# Patient Record
Sex: FEMALE | Race: WHITE | Employment: UNEMPLOYED | URBAN - METROPOLITAN AREA
[De-identification: names, ages, dates, MRNs, and addresses within clinical notes are randomized per-mention and may not be internally consistent; named-entity substitution may affect disease eponyms.]

---

## 2022-03-23 ENCOUNTER — HOME HEALTH ADMISSION (OUTPATIENT)
Dept: HOME HEALTH SERVICES | Facility: HOME HEALTH | Age: 62
End: 2022-03-23
Payer: MEDICARE

## 2022-03-24 ENCOUNTER — HOME CARE VISIT (OUTPATIENT)
Dept: SCHEDULING | Facility: HOME HEALTH | Age: 62
End: 2022-03-24
Payer: MEDICARE

## 2022-03-24 PROCEDURE — 400013 HH SOC

## 2022-03-24 PROCEDURE — G0299 HHS/HOSPICE OF RN EA 15 MIN: HCPCS

## 2022-03-24 NOTE — Clinical Note
I admitted this patient yesterday and she has a new ostomy site because she had a hernia and had surgery to take down her original site and move it to her left side. She has a wound that is 3cm deep from the take down site and I called for wound care orders but they are just saying to cover it with a bandage? It was packed with a wet to dry upon assessment and I had not heard back from the doc until later in the afternoon so that is what I did. Does that sound right? Also I am having a very hard time figuring out what to order her. Her daughter is a  nurse and was trying to tell me what to order but I am having a hard time. Can you please move one of your patients to tomorrow or I can switch you a patient for her. I know you can help her way more than I can. Thank you! Text me or call me if you want more info.     Thanks Davonte Alvarado  [phone]

## 2022-03-25 ENCOUNTER — HOME CARE VISIT (OUTPATIENT)
Dept: SCHEDULING | Facility: HOME HEALTH | Age: 62
End: 2022-03-25
Payer: MEDICARE

## 2022-03-25 ENCOUNTER — HOME CARE VISIT (OUTPATIENT)
Dept: HOME HEALTH SERVICES | Facility: HOME HEALTH | Age: 62
End: 2022-03-25
Payer: MEDICARE

## 2022-03-25 PROCEDURE — A4452 WATERPROOF TAPE: HCPCS

## 2022-03-25 PROCEDURE — MED10158 APPLICATOR, COTTON-TIP, WOOD, 6, STRL

## 2022-03-25 PROCEDURE — A6253 ABSORPT DRG > 48 SQ IN W/O B: HCPCS

## 2022-03-25 PROCEDURE — MED12556 CAN,SPRAY,7.1-OZ,SALINE,WOUND WASH,STRL

## 2022-03-25 PROCEDURE — A6442 CONFORM BAND N/S W<3"/YD: HCPCS

## 2022-03-25 PROCEDURE — A6252 ABSORPT DRG >16 <=48 W/O BDR: HCPCS

## 2022-03-25 PROCEDURE — A4216 STERILE WATER/SALINE, 10 ML: HCPCS

## 2022-03-25 PROCEDURE — A5120 SKIN BARRIER, WIPE OR SWAB: HCPCS

## 2022-03-25 PROCEDURE — G0299 HHS/HOSPICE OF RN EA 15 MIN: HCPCS

## 2022-03-25 PROCEDURE — A4394 OSTOMY POUCH LIQ DEODORANT: HCPCS

## 2022-03-25 PROCEDURE — A6402 STERILE GAUZE <= 16 SQ IN: HCPCS

## 2022-03-25 NOTE — HOME HEALTH
No wound care orders for take down site of previous colostomy. Called Dr Rubin's office to obtain, office called me back left message stating no wound care orders at this time and to just place a bandage on it. Daughter of patient stated that was unacceptable and would I call the office back to clarify. I called and left another message asking to clarify and to obtain wet to dry packing wound care orders. The patient's daughter has also called and asked for wound care orders. Patients daughter states she is a wound care nurse. Awaiting call back      services/Home bound verification: need for home care and homebound status are decreased activity tolerance, procedure teaching and recent hospitalization/surgery. Skilled Reason for admission/summary of clinical condition:  S/P Small bowel resection with extensive lysis of adhesion and creation of new ileostomy education and management    This patient is homebound for the following reasons Requires considerable and taxing effort to leave the home  and Requires the assistance of 1 or more persons to leave the home . Caregiver: patients cg is daughter and is available as needed or assistance with IADL's, ADL's, meal prep, medication management, and taking patient to all doctors appointments. Medications reconciled and all medications are available in the home this visit. The following education was provided regarding medications: All patient medications were reviewed, including side effects, safety, time to administer, purposes, dosages, and frequencies. Medications  are effective at this time.       High risk medication teaching regarding anticoagulants, hyperglycemic agents or opiod narcotics performed (specify) Percocet-risk for dependence, risk for respiratory depression    Dr Ewa Mitchell notified of any discrepancies/look a like medications/medication interactions none    Home health supplies by type and quantity ordered/delivered this visit include:     Patient education provided this visit to include: will order next visit-patient unsure of what she would like. She has used certain items with her previous colostomy and is not sure what she will like. Patient level of understanding of education provided: verbalized understanding    Sharps Education Provided: NA  Patient response to procedure performed:  patient was vert anxious but denied pain    Home exercise program/Homework provided: activity as tolerated, trying to get physical activity 4-5 x weekly. stopping activity if causing shortness of breath or chest pain, dizziness or weakness. Pt/Caregiver instructed on plan of care and are agreeable to plan of care at this time. Physician Dr Noemy Jackson notified via phone of patient admission to home health and plan of care including anticipated frequency of 1d3,4w1,3w2,2w2,1w2,2prn and treatments/interventions/modalities of S/P Small bowel resection with extensive lysis of adhesion and creation of new ileostomy       Discharge planning discussed with patient and caregiver. Discharge planning as follows:  Patient will be discharged once education is complete, and pt is medically stable. Pt/Caregiver did verbalize understanding of discharge planning. Next MD appointment April 14th with Dr Noemy Jackson MD/NP/PA. Patient/caregiver encouraged/instructed to keep appointment as lack of follow through with physician appointment could result in discontinuation of home care services for non-compliance.

## 2022-03-26 ENCOUNTER — HOME CARE VISIT (OUTPATIENT)
Dept: SCHEDULING | Facility: HOME HEALTH | Age: 62
End: 2022-03-26
Payer: MEDICARE

## 2022-03-26 PROCEDURE — G0299 HHS/HOSPICE OF RN EA 15 MIN: HCPCS

## 2022-03-26 NOTE — HOME HEALTH
Skilled reason for visit: assess pts ostomy and ordering supplies, vs, pain, teaching wound care, meds    Caregiver involvement: patients cg is daughter and she is available as needed or assistance with IADL's, ADL's, meal prep, medication management, and taking patient to all doctors appointments. Medications reviewed and all medications are available in the home this visit. The following education was provided regarding medications, medication interactions, and look alike medications (specify): percocet, ativan,   Medications are effective at this time. Medications reconciled and all meds in home    Home health supplies by type and quantity ordered/delivered this visit include: [employer] for new pt supplies box. Pt is interested in convex wafer due to stoma not protruding enough. Pt is using gabriella supplies right now and wants to try coloplast 2 piece convex. She verb that using the one piece is to hard to get off her skin. Patient education provided this visit: see interventions     Patient level of understanding of education provided: pt is understanding of all procedures performed. Skilled Care Performed this visit: pt stated that ostomy wafer/bag was just placed by RN yesterday. no leaks noted. Pt stated wafer and bag were good and didn't need to be changed. pt has had previous colostomy and is very fluent with ostomy care. Pt stated her colostomy was incarcerated with hernia and had to be removed due to no vascular blood flow. Pt daughter is home care nurse and also helps her mother with her wound care. Awaiting MD to send over wound care orders for old ostomy site on right side of abdomen. Pt very please with JIMI Zelaya with ostomy teaching/instruction and appliance education. Pt is not sure if she will like a convex wafer due to it standing up under her shirt.      Patient response to procedure performed: patient tolerated procedure with no signs of discomfort, grimacing or pain, no complications or concerns noted. Agency Progress toward goals: See interventions    Patient's Progress towards personal goals: PATIENT IS STEADILY PROGRESSING TOWARDS GOALS, STILL NEEDS REINFORCEMENT/ENCOURAGEMENT. WILL CONTINUE TO MONITOR. Home exercise program: continue home exercises program as developed by physical therapy     Continued need for the following skills: Nursing     Plan for next visit: ostomy teaching/education/ordering supplies once pt decides what she likes    Patient and/or caregiver notified and agrees to changes in the Plan of Care YES      The following discharge planning was discussed with the pt/caregiver: Patient will be discharged once education has completed, patient is medically stable and pt/cg are able to independently manage medications and disease process.

## 2022-03-27 ENCOUNTER — HOME CARE VISIT (OUTPATIENT)
Dept: SCHEDULING | Facility: HOME HEALTH | Age: 62
End: 2022-03-27
Payer: MEDICARE

## 2022-03-27 PROCEDURE — G0299 HHS/HOSPICE OF RN EA 15 MIN: HCPCS

## 2022-03-28 ENCOUNTER — HOME CARE VISIT (OUTPATIENT)
Dept: SCHEDULING | Facility: HOME HEALTH | Age: 62
End: 2022-03-28
Payer: MEDICARE

## 2022-03-28 VITALS
SYSTOLIC BLOOD PRESSURE: 118 MMHG | OXYGEN SATURATION: 96 % | OXYGEN SATURATION: 98 % | DIASTOLIC BLOOD PRESSURE: 60 MMHG | SYSTOLIC BLOOD PRESSURE: 124 MMHG | HEART RATE: 88 BPM | RESPIRATION RATE: 14 BRPM | RESPIRATION RATE: 14 BRPM | DIASTOLIC BLOOD PRESSURE: 72 MMHG | TEMPERATURE: 97.8 F | TEMPERATURE: 98.4 F | HEART RATE: 84 BPM

## 2022-03-28 VITALS
OXYGEN SATURATION: 98 % | RESPIRATION RATE: 16 BRPM | DIASTOLIC BLOOD PRESSURE: 82 MMHG | TEMPERATURE: 98.4 F | HEART RATE: 86 BPM | SYSTOLIC BLOOD PRESSURE: 122 MMHG

## 2022-03-28 NOTE — HOME HEALTH
Caregiver involvement is  and Daughter they are available 24/7. They  Assists with ADLs, Medication management, Transportation to appointments, Meal prep and assists with ambulation. Medications reconciled and all medications are available in the home this visit. The following education was provided regarding medications, medication interactions, and look a like medications: [facility] Medications  are effective at this time. Patient education provided this visit:Patient is a fall risk, went over the need of having someone with him when ambulating, keep hallways and living areas free of clutter and throw rugs. She had just  changed colostomy bag and wafer a few days ago ,Site is well adhered, Ostomy is putting out liquid stool. I changed her abdominal dressing and patient tolerated it well. I spoke on the phone with her daughter, Cale and she plans on being at the home tomorrow so we can observe her changing this daily dressing. I showed her how to protect skin and the importance of same. ENCOURAGED PATIENT TO HAVE PROTEIN WITH EACH MEAL TO PROMOTE WOUND HEALING. Continue a heart Healthy diet. Watch out for high sodium foods, read labels. Observe for signs of infection. Monitor B/P, take meds as ordered and f/u with PCP. Read labels of foods, stay away from high sodium canned foods and processed meats. Discussed the importance of staying well hydrated with water 6-8 glasses a day. Progress toward goals: Abdominal wound without signs of infection, minimal drainage. Ostomy is functioning well. Home exercise program: Continue to change ostomy bags as needed to prevent any skin excoriation. Discussed s/s of infection to monitor for, s/s of UTI, who to report to/when. Instructed cg to notify staff/md/seek tx if complications occur.  Patient instructed to maintain clear pathways in home and to minimize clutter to prevent falls from occurring/minimize fall potential.   Patient needing a well balanced diet with the 5 food groups, patient to increase fiber in diet, fresh fruits and vegetables, whole grains and increase water intake. Maintain healthy low sodium  diet, Continue to monitor B/P and observe for signs of infection. I went over the importance of taking all prescriptions as ordered. I discussed how to avoid extra sodium in her diet. We discussed the signs of infection and when to call MD.  We discussed the high risk for falls and ways to prevent falls in the future. We discussed taking B/P daily and keeping a log. We also discussed the need of a heart healthy diet, and the need to change positions frequently. Continue high Protein diet, Continue frequent ambulation, Keep wound clean dry and dressing intact. continue observing for signs of infection. Continued need for the following skills: Nursing    The following discharge planning was discussed with the pt/caregiver: Will discharge patient when medically stable and education is completed. Will discharge from nursing when dressing is no longer needed or can be managed by caregivers independently.

## 2022-03-28 NOTE — Clinical Note
Pt requested ostomy nurse to review different types of appliances with convex vs flat wafers. Pt was called at 0843 and LM. Pt called back at  and i verb to pt that ostomy nurse will see her on tuesday. Pt was in agreement to have ostomy nurse come out. I verb to pt that we are still awaiting md orders for her right abd side wound. I told her weekend nurse faxed orders to md. I verb to pt that we cant perform any packing to her abd wound without an MD order. Pt daughter is home care nurse and is able to independently perform wound care and ostomy care. Pt has had a colostomy for the past 3+ years and is well versed with ostomy care/appliance fitting/troubleshooting leaks and ostomy diet. I also asked pt if her new coloplast new pt kit had arrived yet. She verb no. I told her it was just ordered on Friday and it might take a few days to arrive. Pt wanted to try coloplast brand and convex wafers. Pt has used gabriella for her past colostomy. IDT communication with Manager Catarina Verma, RN, MSN on pt status and pt care going forward.      Reema Frazier RN, BSN

## 2022-03-28 NOTE — HOME HEALTH
Pt requested ostomy nurse to review different types of appliances with convex vs flat wafers. Pt was called at 0843 and LM. Pt called back at  and i verb to pt that ostomy nurse will see her on tuesday. Pt was in agreement to have ostomy nurse come out. I verb to pt that we are still awaiting md orders for her right abd side wound. I told her weekend nurse faxed orders to md. I verb to pt that we cant perform any packing to her abd wound without an MD order. Pt daughter is home care nurse and is able to independently perform wound care and ostomy care. Pt has had a colostomy for the past 3+ years and is well versed with ostomy care/appliance fitting/troubleshooting leaks and ostomy diet. I also asked pt if her new coloplast new pt kit had arrived yet. She verb no. I told her it was just ordered on Friday and it might take a few days to arrive. Pt wanted to try coloplast brand and convex wafers. Pt has used gabriella for her past colostomy. IDT communication with Manager Benny Weber, RN, MSN on pt status and pt care going forward.

## 2022-03-28 NOTE — HOME HEALTH
Caregiver involvement is  and Daughter they are available 24/7. They  Assists with ADLs, Medication management, Transportation to appointments, Meal prep and assists with ambulation. Medications reconciled and all medications are available in the home this visit. The following education was provided regarding medications, medication interactions, and look a like medications: Katy Gutiérrez Medications  are effective at this time. Patient education provided this visit:Patient is a fall risk, went over the need of having someone with him when ambulating, keep hallways and living areas free of clutter and throw rugs. She had just  changed colostomy bag and wafer a few days ago ,Site is well adhered, Ostomy is putting out liquid stool. Daughter, Cale was there today. She did abdomidal dressing without problems, showing good clean technique. Cale will change dressing on non nursing days. I showed her how to protect skin and the importance of same. ENCOURAGED PATIENT TO HAVE PROTEIN WITH EACH MEAL TO PROMOTE WOUND HEALING. Continue a heart Healthy diet. Watch out for high sodium foods, read labels. Observe for signs of infection. Monitor B/P, take meds as ordered and f/u with PCP. Read labels of foods, stay away from high sodium canned foods and processed meats. Discussed the importance of staying well hydrated with water 6-8 glasses a day. Progress toward goals: Abdominal wound without signs of infection, minimal drainage. Ostomy is functioning well. Home exercise program: Continue to change ostomy bags as needed to prevent any skin excoriation. Discussed s/s of infection to monitor for, s/s of UTI, who to report to/when. Instructed cg to notify staff/md/seek tx if complications occur.  Patient instructed to maintain clear pathways in home and to minimize clutter to prevent falls from occurring/minimize fall potential.   Patient needing a well balanced diet with the 5 food groups, patient to increase fiber in diet, fresh fruits and vegetables, whole grains and increase water intake. Maintain healthy low sodium  diet, Continue to monitor B/P and observe for signs of infection. I went over the importance of taking all prescriptions as ordered. I discussed how to avoid extra sodium in her diet. We discussed the signs of infection and when to call MD.  We discussed the high risk for falls and ways to prevent falls in the future. We discussed taking B/P daily and keeping a log. We also discussed the need of a heart healthy diet, and the need to change positions frequently. Continue high Protein diet, Continue frequent ambulation, Keep wound clean dry and dressing intact. continue observing for signs of infection. Continued need for the following skills: Nursing    The following discharge planning was discussed with the pt/caregiver: Will discharge patient when medically stable and education is completed.  Will discharge from nursing when dressing is no longer needed or can be managed by caregivers independently

## 2022-03-29 ENCOUNTER — HOME CARE VISIT (OUTPATIENT)
Dept: SCHEDULING | Facility: HOME HEALTH | Age: 62
End: 2022-03-29
Payer: MEDICARE

## 2022-03-29 VITALS
DIASTOLIC BLOOD PRESSURE: 70 MMHG | SYSTOLIC BLOOD PRESSURE: 130 MMHG | RESPIRATION RATE: 18 BRPM | HEART RATE: 96 BPM | OXYGEN SATURATION: 98 % | TEMPERATURE: 97.6 F

## 2022-03-29 PROCEDURE — G0300 HHS/HOSPICE OF LPN EA 15 MIN: HCPCS

## 2022-03-29 NOTE — HOME HEALTH
Skilled reason for visit: Ostomy education and wound care     Caregiver involvement: Pt independent with care    Medications reviewed and all medications are available in the home this visit. The following education was provided regarding medications:  NA at this time  MD notified of any discrepancies/look a-like medications/medication interactions: NA  Medications are effective  at this time. Home health supplies by type and quantity ordered/delivered this visit include: awaiting supplies at this time    Patient education provided this visit: REVIEWED OSTOMY COMPLICATIONS/DIET: AVOID NUTS, SEEDS, REPORT ANY STOMA THAT IS NOT A PINK GLISTENING COLOR REPORT TO MD IMMEDIATELY/SEEK MEDICAL TREATMENT. CHANGE WAFER & BAG WHEN LEAKS OCCUR OTHERWISE AS PRESCRIBED. INSPECT SKIN DAILY FOR ABNORMAL CHANGES/OR BREAKDOWN. REPORT SKIN COMPLICATIONS TO MEDICAL PROVIDERS OR HOME CARE STAFF. Sharps education provided: MATTHEW     Patient level of understanding of education provided: Judith West all understanding to above education  Skilled Care Performed this visit:  wound care and some ostomy education       Patient response to procedure performed:  no pain verbalzied at this time     Agency Progress toward goals: Progressing toward interventions above      Patient's Progress towards personal goals: Progressing toward interventions above      Home exercise program: pt conitnues to take  medication as ordered and follows up on all  md appintments    Continued need for the following skills: nursing     Plan for next visit: Ostomy     Patient and/or caregiver notified and agrees to changes in the Plan of Care     The following discharge planning was discussed with the pt/caregiver: when patient reaches goals and medication is managed, and disease processes are understood patient agrees and understand that discharge will take place.

## 2022-04-01 ENCOUNTER — HOME CARE VISIT (OUTPATIENT)
Dept: SCHEDULING | Facility: HOME HEALTH | Age: 62
End: 2022-04-01
Payer: MEDICARE

## 2022-04-01 PROCEDURE — G0300 HHS/HOSPICE OF LPN EA 15 MIN: HCPCS

## 2022-04-01 NOTE — HOME HEALTH
Skilled reason for visit: Ostomy Education     Caregiver involvement: Pt independent with care      Medications reviewed and all medication available in the home this visit. The following education was provided regarding medications:  MATTHEW  MD notified of any discrepancies/look a-like medications/medication interactions: NA  Medications are Effective at this time. Home health supplies by type and quantity ordered/delivered this visit include: supplies arrived as ordered    Patient education provided this visit: Educated n Convetec wafer and bag, showed her how to apply it. Pt to let nurse know if those work if so will order more. pt continues to have no appetitie. Sharps education provided: MATTHEW    Patient level of understanding of education provided: Progressing toward interventions above      Skilled Care Performed this visit: Ostomy care and education    Patient response to procedure performed:  no pain     Agency Progress toward goals: Progressing toward interventions above      Patient's Progress towards personal goals: Progressing toward interventions above      Home exercise program: pt conitnues to take  medication as ordered and follows up on all  md appintments      Continued need for the following skills: nursing     Plan for next visit: ostomy care    Patient and/or caregiver notified and agrees to changes in the Plan of Care yes    The following discharge planning was discussed with the pt/caregiver: when patient reaches goals and medication is managed, and disease processes are understood patient agrees and understand that discharge will take place.

## 2022-04-04 ENCOUNTER — HOME CARE VISIT (OUTPATIENT)
Dept: SCHEDULING | Facility: HOME HEALTH | Age: 62
End: 2022-04-04
Payer: MEDICARE

## 2022-04-04 PROCEDURE — A6402 STERILE GAUZE <= 16 SQ IN: HCPCS

## 2022-04-04 PROCEDURE — A5057 1 PC OST POU W BUILT-IN CONV: HCPCS

## 2022-04-04 PROCEDURE — G0299 HHS/HOSPICE OF RN EA 15 MIN: HCPCS

## 2022-04-04 PROCEDURE — A4452 WATERPROOF TAPE: HCPCS

## 2022-04-04 PROCEDURE — A4216 STERILE WATER/SALINE, 10 ML: HCPCS

## 2022-04-06 ENCOUNTER — HOME CARE VISIT (OUTPATIENT)
Dept: SCHEDULING | Facility: HOME HEALTH | Age: 62
End: 2022-04-06
Payer: MEDICARE

## 2022-04-06 VITALS
DIASTOLIC BLOOD PRESSURE: 70 MMHG | HEART RATE: 89 BPM | OXYGEN SATURATION: 99 % | TEMPERATURE: 97.7 F | RESPIRATION RATE: 16 BRPM | SYSTOLIC BLOOD PRESSURE: 138 MMHG

## 2022-04-06 NOTE — HOME HEALTH
Skilled reason for visit: ostomy supplies order, vs, nursing assessment, abd wound dressing chg    Caregiver involvement: patients cg is daughter and she is available as needed or assistance with IADL's, ADL's, meal prep, medication management, and taking patient to all doctors appointments. Medications reviewed and all medications are available in the home this visit. The following education was provided regarding medications, medication interactions, and look alike medications (specify): norvasc and ativan  Medications are effective at this time. Medications reconciled and all meds in home    Home health supplies by type and quantity ordered/delivered this visit include: PT [address]      Patient education provided this visit: See interventions    Patient level of understanding of education provided: pt is understanding of all procedures performed. Skilled Care Performed this visit: ordering supplies, calling [employer] to talk to rep, confirm with LPN that no supplies were ordered for pt, wound assessment    Patient response to procedure performed: pt tolerated procedure well, pt doesnt like to look at her wound, it makes her nauseated    Agency Progress toward goals: pt meeing goals    Patient's Progress towards personal goals: see interventions    Home exercise program: continue home exercises program as developed by physical therapy     Continued need for the following skills: Nursing and Physical Therapy    Plan for next visit: ostomy supply check, ostomy fit, and wound care     Patient and/or caregiver notified and agrees to changes in the Plan of Care YES      The following discharge planning was discussed with the pt/caregiver: Patient will be discharged once education has completed, patient is medically stable and pt/cg are able to independently manage medications and disease process.

## 2022-04-08 ENCOUNTER — HOME CARE VISIT (OUTPATIENT)
Dept: HOME HEALTH SERVICES | Facility: HOME HEALTH | Age: 62
End: 2022-04-08
Payer: MEDICARE

## 2022-04-09 ENCOUNTER — HOME CARE VISIT (OUTPATIENT)
Dept: SCHEDULING | Facility: HOME HEALTH | Age: 62
End: 2022-04-09
Payer: MEDICARE

## 2022-04-10 ENCOUNTER — HOME CARE VISIT (OUTPATIENT)
Dept: HOME HEALTH SERVICES | Facility: HOME HEALTH | Age: 62
End: 2022-04-10
Payer: MEDICARE

## 2022-04-10 NOTE — HOME HEALTH
Skilled reason for visit: ostomy supply order last sn visit. Caregiver involvement: patients cg is daughter and she is available as needed or assistance with IADL's, ADL's, meal prep, medication management, and taking patient to all doctors appointments. Medications reviewed and all medications are available in the home this visit. The following education was provided regarding medications, medication interactions, and look alike medications (specify): norvasc and ativan    Medications are effective at this time. Medications reconciled and all meds in home      Home health supplies by type and quantity ordered/delivered this visit include:  RN ordered samples through [employer]. Pt had supplies in home. pt has ample gabriella supplies in home from colostomy. Patient education provided this visit: See interventions      Patient level of understanding of education provided: pt is understanding of all procedures performed. Skilled Care Performed this visit: last sn visit called [employer] rep and ordered one piece bags (pt wanted these specific supplies #[id number])- Rep- Blaine Carrington confirmed he will send pts some samples. RN ordered all pt supplies on 4/4/22 in evening. Wrote a note on medline order to manager to send pts supplies asap bc they were non formulary and to please approve order due to pt needs supplies. Patient response to procedure performed: pt tolerated procedure well, pt doesnt like to look at her wound, it makes her nauseated    Agency Progress toward goals: pt meeing goals      Patient's Progress towards personal goals: see interventions, pt is independent with ostomy care. Pt has had ostomy for 4+ years and daughter is LPN and ostomy nurse. Pt has had several visits for sn to order supplies. Pt has never decided on what ostomy supplies she wanted each visit.      Home exercise program: continue home exercises program as developed by physical therapy     Continued need for the following skills: None     Plan for next visit: none     Patient and/or caregiver notified and agrees to changes in the Plan of Care YES        The following discharge planning was discussed with the pt/caregiver: Patient will be discharged today due to refusal of home care.

## 2022-05-09 VITALS
HEART RATE: 88 BPM | OXYGEN SATURATION: 97 % | TEMPERATURE: 98.4 F | RESPIRATION RATE: 16 BRPM | DIASTOLIC BLOOD PRESSURE: 80 MMHG | SYSTOLIC BLOOD PRESSURE: 124 MMHG